# Patient Record
Sex: FEMALE | Race: WHITE | Employment: FULL TIME | URBAN - METROPOLITAN AREA
[De-identification: names, ages, dates, MRNs, and addresses within clinical notes are randomized per-mention and may not be internally consistent; named-entity substitution may affect disease eponyms.]

---

## 2023-03-23 PROBLEM — L40.9 PSORIASIS: Status: ACTIVE | Noted: 2023-03-23

## 2023-03-23 PROBLEM — R39.15 URINARY URGENCY: Status: ACTIVE | Noted: 2023-03-23

## 2023-03-23 PROBLEM — K76.6 PORTAL HYPERTENSION (MULTI): Status: ACTIVE | Noted: 2023-03-23

## 2023-03-23 PROBLEM — D50.9 IRON DEFICIENCY ANEMIA: Status: ACTIVE | Noted: 2023-03-23

## 2023-03-23 PROBLEM — D69.6 THROMBOCYTOPENIA (CMS-HCC): Status: ACTIVE | Noted: 2023-03-23

## 2023-03-23 PROBLEM — K50.90 CROHN'S DISEASE (MULTI): Status: ACTIVE | Noted: 2023-03-23

## 2023-03-23 PROBLEM — L93.0 LUPUS ERYTHEMATOSUS: Status: ACTIVE | Noted: 2023-03-23

## 2023-03-23 PROBLEM — I10 HYPERTENSION: Status: ACTIVE | Noted: 2023-03-23

## 2023-03-23 PROBLEM — H10.9 CONJUNCTIVITIS: Status: ACTIVE | Noted: 2023-03-23

## 2023-03-23 PROBLEM — K21.00 GASTROESOPHAGEAL REFLUX DISEASE WITH ESOPHAGITIS: Status: ACTIVE | Noted: 2023-03-23

## 2023-03-23 PROBLEM — E55.9 VITAMIN D DEFICIENCY: Status: ACTIVE | Noted: 2023-03-23

## 2023-03-23 PROBLEM — K74.60 HEPATIC CIRRHOSIS (MULTI): Status: ACTIVE | Noted: 2023-03-23

## 2023-03-23 PROBLEM — I35.8 AORTIC VALVE SCLEROSIS: Status: ACTIVE | Noted: 2023-03-23

## 2023-03-23 PROBLEM — R74.8 ALKALINE PHOSPHATASE ELEVATION: Status: ACTIVE | Noted: 2023-03-23

## 2023-03-23 PROBLEM — I85.00 ESOPHAGEAL VARICES (MULTI): Status: ACTIVE | Noted: 2023-03-23

## 2023-03-23 PROBLEM — R01.1 CARDIAC MURMUR: Status: ACTIVE | Noted: 2023-03-23

## 2023-03-23 PROBLEM — D61.818 PANCYTOPENIA (MULTI): Status: ACTIVE | Noted: 2023-03-23

## 2023-03-23 PROBLEM — G47.00 INSOMNIA: Status: ACTIVE | Noted: 2023-03-23

## 2023-03-23 PROBLEM — K76.0 NONALCOHOLIC FATTY LIVER DISEASE: Status: ACTIVE | Noted: 2023-03-23

## 2023-03-23 PROBLEM — M19.90 INFLAMMATORY ARTHRITIS: Status: ACTIVE | Noted: 2023-03-23

## 2023-03-23 RX ORDER — DESOXIMETASONE 0.5 MG/G
1 OINTMENT TOPICAL 2 TIMES DAILY
COMMUNITY
Start: 2018-03-19

## 2023-03-23 RX ORDER — PROPRANOLOL HYDROCHLORIDE 20 MG/1
1 TABLET ORAL 2 TIMES DAILY
COMMUNITY
Start: 2020-08-14 | End: 2023-06-12 | Stop reason: SDUPTHER

## 2023-03-23 RX ORDER — LISINOPRIL 20 MG/1
1 TABLET ORAL DAILY
COMMUNITY
Start: 2020-08-14 | End: 2023-08-31 | Stop reason: SDUPTHER

## 2023-03-23 RX ORDER — FAMOTIDINE 20 MG/1
1 TABLET, FILM COATED ORAL DAILY
COMMUNITY
End: 2023-08-31 | Stop reason: SDUPTHER

## 2023-03-23 RX ORDER — BETAMETHASONE DIPROPIONATE 0.5 MG/ML
1 LOTION, AUGMENTED TOPICAL DAILY
COMMUNITY
Start: 2012-06-21

## 2023-03-28 ENCOUNTER — OFFICE VISIT (OUTPATIENT)
Dept: PRIMARY CARE | Facility: CLINIC | Age: 63
End: 2023-03-28
Payer: COMMERCIAL

## 2023-03-28 VITALS
HEART RATE: 78 BPM | BODY MASS INDEX: 31.69 KG/M2 | DIASTOLIC BLOOD PRESSURE: 59 MMHG | OXYGEN SATURATION: 98 % | HEIGHT: 68 IN | SYSTOLIC BLOOD PRESSURE: 129 MMHG | WEIGHT: 209.1 LBS

## 2023-03-28 DIAGNOSIS — I10 PRIMARY HYPERTENSION: ICD-10-CM

## 2023-03-28 DIAGNOSIS — Z12.31 SCREENING MAMMOGRAM, ENCOUNTER FOR: Primary | ICD-10-CM

## 2023-03-28 DIAGNOSIS — K74.60 CIRRHOSIS OF LIVER WITHOUT ASCITES, UNSPECIFIED HEPATIC CIRRHOSIS TYPE (MULTI): ICD-10-CM

## 2023-03-28 DIAGNOSIS — M32.8 OTHER FORMS OF SYSTEMIC LUPUS ERYTHEMATOSUS, UNSPECIFIED ORGAN INVOLVEMENT STATUS (MULTI): ICD-10-CM

## 2023-03-28 DIAGNOSIS — K50.119 CROHN'S DISEASE OF LARGE INTESTINE WITH COMPLICATION (MULTI): ICD-10-CM

## 2023-03-28 DIAGNOSIS — I85.10 SECONDARY ESOPHAGEAL VARICES WITHOUT BLEEDING (MULTI): ICD-10-CM

## 2023-03-28 PROCEDURE — 3074F SYST BP LT 130 MM HG: CPT | Performed by: INTERNAL MEDICINE

## 2023-03-28 PROCEDURE — 99214 OFFICE O/P EST MOD 30 MIN: CPT | Performed by: INTERNAL MEDICINE

## 2023-03-28 PROCEDURE — 3078F DIAST BP <80 MM HG: CPT | Performed by: INTERNAL MEDICINE

## 2023-03-28 PROCEDURE — 1036F TOBACCO NON-USER: CPT | Performed by: INTERNAL MEDICINE

## 2023-03-28 RX ORDER — BETAMETHASONE DIPROPIONATE 0.5 MG/G
OINTMENT TOPICAL EVERY 12 HOURS
COMMUNITY

## 2023-03-28 RX ORDER — ACETAMINOPHEN 500 MG
TABLET ORAL
COMMUNITY

## 2023-03-28 ASSESSMENT — ENCOUNTER SYMPTOMS
SHORTNESS OF BREATH: 0
SORE THROAT: 0
LIGHT-HEADEDNESS: 0
MYALGIAS: 0
VOMITING: 0
HEADACHES: 0
WEAKNESS: 0
FREQUENCY: 0
DIARRHEA: 0
NAUSEA: 0
HEMATURIA: 0
FATIGUE: 0
DIFFICULTY URINATING: 0
CONSTIPATION: 0
ARTHRALGIAS: 0
FEVER: 0
DIZZINESS: 0
COUGH: 0
DYSURIA: 0
PALPITATIONS: 0
CHILLS: 0

## 2023-03-28 ASSESSMENT — PATIENT HEALTH QUESTIONNAIRE - PHQ9
2. FEELING DOWN, DEPRESSED OR HOPELESS: NOT AT ALL
1. LITTLE INTEREST OR PLEASURE IN DOING THINGS: NOT AT ALL
SUM OF ALL RESPONSES TO PHQ9 QUESTIONS 1 AND 2: 0

## 2023-03-28 ASSESSMENT — PAIN SCALES - GENERAL: PAINLEVEL: 0-NO PAIN

## 2023-03-28 NOTE — ASSESSMENT & PLAN NOTE
No active diarrhea or symptoms but patient is overdue to see her gastroenterologist Dr. Wilde and we encouraged her to make and keep that appointment

## 2023-03-28 NOTE — ASSESSMENT & PLAN NOTE
>>ASSESSMENT AND PLAN FOR LUPUS ERYTHEMATOSUS WRITTEN ON 3/28/2023  4:25 PM BY ISABELLA LING MD    Lupus is quiescent at this parent with no active disease.

## 2023-03-28 NOTE — ASSESSMENT & PLAN NOTE
Patient has hepatic cirrhosis secondary to her Crohn's disease and her lupus.  It did lead to esophageal varices with and portal hypertension but she has no known bleeding complications to date.

## 2023-03-28 NOTE — PROGRESS NOTES
"Subjective   Patient ID: Izabela Ware is a 62 y.o. female who presents for follow-up for HTN management.  BN    Patient is here for routine 4-month follow-up and follow-up on her lupus, Crohn's, hypertension, portal hypertension with reflux symptoms and esophageal varices with no bleeding.  Patient appears to be doing well with no active Crohn's lupus or other symptoms.  She has no current complaints.  She is overdue for follow-up for colonoscopy and long overdue for mammogram.        Review of Systems   Constitutional:  Negative for chills, fatigue and fever.   HENT:  Negative for sore throat.    Eyes:  Negative for visual disturbance.   Respiratory:  Negative for cough and shortness of breath.    Cardiovascular:  Negative for chest pain, palpitations and leg swelling.   Gastrointestinal:  Negative for constipation, diarrhea, nausea and vomiting.   Genitourinary:  Negative for difficulty urinating, dysuria, frequency, hematuria and urgency.   Musculoskeletal:  Negative for arthralgias and myalgias.   Skin:  Negative for rash.   Neurological:  Negative for dizziness, syncope, weakness, light-headedness and headaches.       Objective   Physical Exam  Constitutional:       Appearance: Normal appearance.   HENT:      Head: Normocephalic and atraumatic.      Nose: Nose normal.   Eyes:      Extraocular Movements: Extraocular movements intact.      Pupils: Pupils are equal, round, and reactive to light.   Cardiovascular:      Rate and Rhythm: Normal rate and regular rhythm.   Pulmonary:      Breath sounds: Normal breath sounds.   Abdominal:      General: Abdomen is flat. Bowel sounds are normal.      Palpations: Abdomen is soft.   Musculoskeletal:      Right lower leg: No edema.      Left lower leg: No edema.   Neurological:      Mental Status: She is alert.     /59 (BP Location: Left arm, Patient Position: Sitting)   Pulse 78   Ht 1.727 m (5' 8\")   Wt 94.8 kg (209 lb 1.6 oz)   SpO2 98%   BMI 31.79 kg/m² "       Assessment/Plan   Problem List Items Addressed This Visit          Circulatory    Esophageal varices (CMS/HCC)    Hypertension     Blood pressure is stable and well-controlled.            Digestive    Hepatic cirrhosis (CMS/HCC)     Patient has hepatic cirrhosis secondary to her Crohn's disease and her lupus.  It did lead to esophageal varices with and portal hypertension but she has no known bleeding complications to date.            Immune    Crohn's disease (CMS/HCC)     No active diarrhea or symptoms but patient is overdue to see her gastroenterologist Dr. Wilde and we encouraged her to make and keep that appointment            Other    Lupus erythematosus     Lupus is quiescent at this parent with no active disease.         Screening mammogram, encounter for - Primary    Relevant Orders    BI mammo bilateral screening tomosynthesis     Follow up Dr Miller in 4 months           It has been a pleasure seeing you.

## 2023-06-12 DIAGNOSIS — I10 PRIMARY HYPERTENSION: ICD-10-CM

## 2023-06-12 DIAGNOSIS — I85.10 SECONDARY ESOPHAGEAL VARICES WITHOUT BLEEDING (MULTI): ICD-10-CM

## 2023-06-12 RX ORDER — PROPRANOLOL HYDROCHLORIDE 20 MG/1
20 TABLET ORAL 2 TIMES DAILY
Qty: 180 TABLET | Refills: 3 | Status: SHIPPED | OUTPATIENT
Start: 2023-06-12 | End: 2023-08-31 | Stop reason: SDUPTHER

## 2023-06-12 NOTE — TELEPHONE ENCOUNTER
Propanolol refill requested via VM to [facility].  EDUIN pt.  Aleks  NOV 7-/  LOV 3-.  No med changes at LOV; Rx sent for approval.  aleks

## 2023-07-25 ENCOUNTER — OFFICE VISIT (OUTPATIENT)
Dept: PRIMARY CARE | Facility: CLINIC | Age: 63
End: 2023-07-25
Payer: COMMERCIAL

## 2023-07-25 VITALS
HEIGHT: 68 IN | DIASTOLIC BLOOD PRESSURE: 53 MMHG | SYSTOLIC BLOOD PRESSURE: 118 MMHG | OXYGEN SATURATION: 97 % | HEART RATE: 72 BPM | WEIGHT: 217.6 LBS | BODY MASS INDEX: 32.98 KG/M2

## 2023-07-25 DIAGNOSIS — E55.9 VITAMIN D DEFICIENCY: ICD-10-CM

## 2023-07-25 DIAGNOSIS — I10 PRIMARY HYPERTENSION: ICD-10-CM

## 2023-07-25 DIAGNOSIS — K21.00 GASTROESOPHAGEAL REFLUX DISEASE WITH ESOPHAGITIS WITHOUT HEMORRHAGE: ICD-10-CM

## 2023-07-25 DIAGNOSIS — K76.6 PORTAL HYPERTENSION (MULTI): ICD-10-CM

## 2023-07-25 DIAGNOSIS — Z00.00 ANNUAL PHYSICAL EXAM: Primary | ICD-10-CM

## 2023-07-25 DIAGNOSIS — M32.8 OTHER FORMS OF SYSTEMIC LUPUS ERYTHEMATOSUS, UNSPECIFIED ORGAN INVOLVEMENT STATUS (MULTI): ICD-10-CM

## 2023-07-25 DIAGNOSIS — L40.9 PSORIASIS: ICD-10-CM

## 2023-07-25 DIAGNOSIS — K50.10 CROHN'S DISEASE OF LARGE INTESTINE WITHOUT COMPLICATION (MULTI): ICD-10-CM

## 2023-07-25 PROCEDURE — 99214 OFFICE O/P EST MOD 30 MIN: CPT | Performed by: INTERNAL MEDICINE

## 2023-07-25 PROCEDURE — 1036F TOBACCO NON-USER: CPT | Performed by: INTERNAL MEDICINE

## 2023-07-25 PROCEDURE — 3074F SYST BP LT 130 MM HG: CPT | Performed by: INTERNAL MEDICINE

## 2023-07-25 PROCEDURE — 3078F DIAST BP <80 MM HG: CPT | Performed by: INTERNAL MEDICINE

## 2023-07-25 ASSESSMENT — ENCOUNTER SYMPTOMS
DIFFICULTY URINATING: 0
FREQUENCY: 0
COUGH: 0
CHILLS: 0
VOMITING: 0
DIZZINESS: 0
FEVER: 0
LIGHT-HEADEDNESS: 0
SHORTNESS OF BREATH: 0
MYALGIAS: 0
FATIGUE: 0
SORE THROAT: 0
CONSTIPATION: 0
DIARRHEA: 0
NAUSEA: 0
PALPITATIONS: 0
DYSURIA: 0
HEMATURIA: 0
ARTHRALGIAS: 0
WEAKNESS: 0
HEADACHES: 0

## 2023-07-25 ASSESSMENT — PATIENT HEALTH QUESTIONNAIRE - PHQ9
1. LITTLE INTEREST OR PLEASURE IN DOING THINGS: NOT AT ALL
2. FEELING DOWN, DEPRESSED OR HOPELESS: NOT AT ALL
SUM OF ALL RESPONSES TO PHQ9 QUESTIONS 1 AND 2: 0

## 2023-07-25 ASSESSMENT — PAIN SCALES - GENERAL: PAINLEVEL: 0-NO PAIN

## 2023-07-25 NOTE — PROGRESS NOTES
Subjective   Patient ID: Izabela Ware is a 63 y.o. female who presents for 4 month re for HTN management.  BN    Patient is here for 4-month follow-up for her hypertension lupus and Crohn's.  She remains stable and feels well overall.  At her last appointment we reminded her to follow-up with GI, Dr. Wilde as she is overdue for colonoscopy but she admits she did not do it she lost track of time.        Review of Systems   Constitutional:  Negative for chills, fatigue and fever.   HENT:  Negative for sore throat.    Eyes:  Negative for visual disturbance.   Respiratory:  Negative for cough and shortness of breath.    Cardiovascular:  Negative for chest pain, palpitations and leg swelling.   Gastrointestinal:  Negative for constipation, diarrhea, nausea and vomiting.   Genitourinary:  Negative for difficulty urinating, dysuria, frequency, hematuria and urgency.   Musculoskeletal:  Negative for arthralgias and myalgias.   Skin:  Negative for rash.   Neurological:  Negative for dizziness, syncope, weakness, light-headedness and headaches.       Objective   Medication Documentation Review Audit       Reviewed by Vannesa Miller MD (Physician) on 07/25/23 at 1052      Medication Order Taking? Sig Documenting Provider Last Dose Status   betamethasone dipropionate (Diprolene) 0.05 % ointment [id number] No every 12 hours. Historical MD Lluvia Not Taking Active   betamethasone, augmented, (Diprolene) 0.05 % lotion [id number] No Apply 1 Application topically once daily. Marilyn Teixeira MD Taking Active   cholecalciferol (Vitamin D-3) 5,000 Units tablet [id number] No Take by mouth. Historical Provider, MD Taking Active   desoximetasone 0.05 % ointment [id number] No Apply 1 Application topically in the morning and 1 Application before bedtime. Historical Provider, MD Taking Active   doxylamine-PE-DM-acetaminophen 6.25-5- mg/15 mL liquid [id number] No 1 tab Historical Provider, MD Not Taking Active   famotidine (Pepcid)  "20 mg tablet [id number] No Take 1 tablet (20 mg) by mouth once daily. Historical Provider, MD Not Taking Active   lisinopril 20 mg tablet [id number] No Take 1 tablet (20 mg) by mouth once daily. Historical Provider, MD Taking Active   propranolol (Inderal) 20 mg tablet [id number]  Take 1 tablet (20 mg) by mouth 2 times a day. Vannesa Miller MD  Active                  Physical Exam  Constitutional:       Appearance: Normal appearance.   HENT:      Head: Normocephalic and atraumatic.      Nose: Nose normal.   Eyes:      Extraocular Movements: Extraocular movements intact.      Pupils: Pupils are equal, round, and reactive to light.   Cardiovascular:      Rate and Rhythm: Normal rate and regular rhythm.   Pulmonary:      Breath sounds: Normal breath sounds.   Abdominal:      General: Abdomen is flat. Bowel sounds are normal.      Palpations: Abdomen is soft.   Musculoskeletal:      Right lower leg: No edema.      Left lower leg: No edema.   Neurological:      Mental Status: She is alert.     /53 (BP Location: Left arm, Patient Position: Sitting)   Pulse 72   Ht 1.715 m (5' 7.5\")   Wt 98.7 kg (217 lb 9.6 oz)   SpO2 97%   BMI 33.58 kg/m²       Assessment/Plan   Problem List Items Addressed This Visit       Crohn's disease (CMS/HCC)     Patient urged to follow-up with her gastroenterologist Dr. Wilde she is long overdue for her colonoscopy.  Patient says that she will try harder to get an appointment with him    I offered to have our staff help set up the appointment but she declined.         Relevant Orders    Lipid Panel    CBC    Comprehensive Metabolic Panel    TSH with reflex to Free T4 if abnormal    Vitamin D 1,25 Dihydroxy    Gastroesophageal reflux disease with esophagitis     Gastroesophageal reflux disease is stable with no breakthrough symptoms.         Relevant Orders    Lipid Panel    CBC    Comprehensive Metabolic Panel    TSH with reflex to Free T4 if abnormal    Vitamin D 1,25 Dihydroxy    " Hypertension     Hypertension is stable and well-controlled.         Relevant Orders    Lipid Panel    CBC    Comprehensive Metabolic Panel    TSH with reflex to Free T4 if abnormal    Vitamin D 1,25 Dihydroxy    Lupus erythematosus    Relevant Orders    Lipid Panel    CBC    Comprehensive Metabolic Panel    TSH with reflex to Free T4 if abnormal    Vitamin D 1,25 Dihydroxy    Portal hypertension (CMS/HCC)    Relevant Orders    Lipid Panel    CBC    Comprehensive Metabolic Panel    TSH with reflex to Free T4 if abnormal    Vitamin D 1,25 Dihydroxy    Vitamin D deficiency    Relevant Orders    Lipid Panel    CBC    Comprehensive Metabolic Panel    TSH with reflex to Free T4 if abnormal    Vitamin D 1,25 Dihydroxy    Psoriasis    Relevant Orders    Lipid Panel    CBC    Comprehensive Metabolic Panel    TSH with reflex to Free T4 if abnormal    Vitamin D 1,25 Dihydroxy     Other Visit Diagnoses       Annual physical exam    -  Primary    Relevant Orders    Lipid Panel    CBC    Comprehensive Metabolic Panel    TSH with reflex to Free T4 if abnormal    Vitamin D 1,25 Dihydroxy                   It has been a pleasure seeing you.

## 2023-07-25 NOTE — ASSESSMENT & PLAN NOTE
Patient urged to follow-up with her gastroenterologist Dr. Wilde she is long overdue for her colonoscopy.  Patient says that she will try harder to get an appointment with him    I offered to have our staff help set up the appointment but she declined.

## 2023-07-25 NOTE — PATIENT INSTRUCTIONS
Please follow up with Dr Wilde for your colonoscopy, you are very overdue    Get fasting labs in Oct before next appointment    Follow up Dr Miller in 4 months  30 min appointment for HTN etc          Ways to Help Prevent Falls at Home    Quick Tips   ? Ask for help if you need it. Most people want to help!   ? Get up slowly after sitting or laying down   ? Wear a medical alert device or keep cell phone in your pocket   ? Use night lights, especially areas near a bathroom   ? Keep the items you use often within reach on a small stool or end table   ? Use an assistive device such as walker or cane, as directed by provider/physical therapy   ? Use a non-slip mat and grab bars in your bathroom. Look for home health sections for best options     Other Areas to Focus On   ? Exercise and nutrition: Regular exercise or taking a falls prevention class are great ways improve strength and balance. Don’t forget to stay hydrated and bring a snack!   ? Medicine side effects: Some medicines can make you sleepy or dizzy, which could cause a fall. Ask your healthcare provider about the side effects your medicines could cause. Be sure to let them know if you take any vitamins or supplements as well.   ? Tripping hazards: Remove items you could trip on, such as loose mats, rugs, cords, and clutter. Wear closed toe shoes with rubber soles.   ? Health and wellness: Get regular checkups with your healthcare provider, plus routine vision and hearing screenings. Talk with your healthcare provider about:   o Your medicines and the possible side effects - bring them in a bag if that is easier!   o Problems with balance or feeling dizzy   o Ways to promote bone health, such as Vitamin D and calcium supplements   o Questions or concerns about falling     *Ask your healthcare team if you have questions     [facility], 2022

## 2023-08-31 DIAGNOSIS — I10 PRIMARY HYPERTENSION: ICD-10-CM

## 2023-08-31 DIAGNOSIS — I85.10 SECONDARY ESOPHAGEAL VARICES WITHOUT BLEEDING (MULTI): ICD-10-CM

## 2023-08-31 RX ORDER — PROPRANOLOL HYDROCHLORIDE 20 MG/1
20 TABLET ORAL 2 TIMES DAILY
Qty: 180 TABLET | Refills: 0 | Status: SHIPPED | OUTPATIENT
Start: 2023-08-31 | End: 2024-01-30 | Stop reason: SDUPTHER

## 2023-08-31 RX ORDER — LISINOPRIL 20 MG/1
20 TABLET ORAL DAILY
Qty: 30 TABLET | Refills: 0 | Status: SHIPPED | OUTPATIENT
Start: 2023-08-31 | End: 2023-09-25 | Stop reason: SDUPTHER

## 2023-08-31 RX ORDER — FAMOTIDINE 20 MG/1
20 TABLET, FILM COATED ORAL DAILY
Qty: 30 TABLET | Refills: 0 | Status: SHIPPED | OUTPATIENT
Start: 2023-08-31 | End: 2024-01-30 | Stop reason: SDUPTHER

## 2023-08-31 NOTE — TELEPHONE ENCOUNTER
Med refill    lisinopril 20 mg tablet   Take 1 tablet (20 mg) by mouth once daily     propranolol (Inderal) 20 mg tablet    Take 1 tablet (20 mg) by mouth 2 times a day    famotidine (Pepcid) 20 mg tablet    Take 1 tablet (20 mg) by mouth once daily     Lis Wilde    BN

## 2023-09-25 DIAGNOSIS — I10 PRIMARY HYPERTENSION: ICD-10-CM

## 2023-09-25 NOTE — TELEPHONE ENCOUNTER
Med refill from voicemail      lisinopril 20 mg tablet    Take 1 tablet (20 mg) by mouth once daily.,     Lis Wilde    BN

## 2023-09-26 RX ORDER — LISINOPRIL 20 MG/1
20 TABLET ORAL DAILY
Qty: 30 TABLET | Refills: 0 | Status: SHIPPED | OUTPATIENT
Start: 2023-09-26 | End: 2023-10-31 | Stop reason: SDUPTHER

## 2023-10-31 DIAGNOSIS — I10 PRIMARY HYPERTENSION: ICD-10-CM

## 2023-10-31 RX ORDER — LISINOPRIL 20 MG/1
20 TABLET ORAL DAILY
Qty: 30 TABLET | Refills: 0 | Status: SHIPPED | OUTPATIENT
Start: 2023-10-31 | End: 2023-12-12 | Stop reason: SDUPTHER

## 2023-11-21 ENCOUNTER — APPOINTMENT (OUTPATIENT)
Dept: PRIMARY CARE | Facility: CLINIC | Age: 63
End: 2023-11-21
Payer: COMMERCIAL

## 2023-12-12 DIAGNOSIS — I10 PRIMARY HYPERTENSION: ICD-10-CM

## 2023-12-12 RX ORDER — LISINOPRIL 20 MG/1
20 TABLET ORAL DAILY
Qty: 30 TABLET | Refills: 0 | Status: SHIPPED | OUTPATIENT
Start: 2023-12-12 | End: 2024-01-30 | Stop reason: SDUPTHER

## 2023-12-26 ENCOUNTER — LAB (OUTPATIENT)
Dept: LAB | Facility: LAB | Age: 63
End: 2023-12-26
Payer: COMMERCIAL

## 2023-12-26 DIAGNOSIS — L40.9 PSORIASIS: ICD-10-CM

## 2023-12-26 DIAGNOSIS — M32.8 OTHER FORMS OF SYSTEMIC LUPUS ERYTHEMATOSUS, UNSPECIFIED ORGAN INVOLVEMENT STATUS (MULTI): ICD-10-CM

## 2023-12-26 DIAGNOSIS — K76.6 PORTAL HYPERTENSION (MULTI): ICD-10-CM

## 2023-12-26 DIAGNOSIS — K21.00 GASTROESOPHAGEAL REFLUX DISEASE WITH ESOPHAGITIS WITHOUT HEMORRHAGE: ICD-10-CM

## 2023-12-26 DIAGNOSIS — Z00.00 ANNUAL PHYSICAL EXAM: ICD-10-CM

## 2023-12-26 DIAGNOSIS — K50.10 CROHN'S DISEASE OF LARGE INTESTINE WITHOUT COMPLICATION (MULTI): ICD-10-CM

## 2023-12-26 DIAGNOSIS — E55.9 VITAMIN D DEFICIENCY: ICD-10-CM

## 2023-12-26 DIAGNOSIS — I10 PRIMARY HYPERTENSION: ICD-10-CM

## 2023-12-26 LAB
ALBUMIN SERPL BCP-MCNC: 3.4 G/DL (ref 3.4–5)
ALP SERPL-CCNC: 81 U/L (ref 33–136)
ALT SERPL W P-5'-P-CCNC: 14 U/L (ref 7–45)
ANION GAP SERPL CALC-SCNC: 12 MMOL/L (ref 10–20)
AST SERPL W P-5'-P-CCNC: 27 U/L (ref 9–39)
BILIRUB SERPL-MCNC: 1.7 MG/DL (ref 0–1.2)
BUN SERPL-MCNC: 9 MG/DL (ref 6–23)
CALCIUM SERPL-MCNC: 9.2 MG/DL (ref 8.6–10.6)
CHLORIDE SERPL-SCNC: 106 MMOL/L (ref 98–107)
CHOLEST SERPL-MCNC: 157 MG/DL (ref 0–199)
CHOLESTEROL/HDL RATIO: 3
CO2 SERPL-SCNC: 27 MMOL/L (ref 21–32)
CREAT SERPL-MCNC: 0.62 MG/DL (ref 0.5–1.05)
ERYTHROCYTE [DISTWIDTH] IN BLOOD BY AUTOMATED COUNT: 13.4 % (ref 11.5–14.5)
GFR SERPL CREATININE-BSD FRML MDRD: >90 ML/MIN/1.73M*2
GLUCOSE SERPL-MCNC: 126 MG/DL (ref 74–99)
HCT VFR BLD AUTO: 39.5 % (ref 36–46)
HDLC SERPL-MCNC: 53.2 MG/DL
HGB BLD-MCNC: 13 G/DL (ref 12–16)
LDLC SERPL CALC-MCNC: 90 MG/DL
MCH RBC QN AUTO: 30.9 PG (ref 26–34)
MCHC RBC AUTO-ENTMCNC: 32.9 G/DL (ref 32–36)
MCV RBC AUTO: 94 FL (ref 80–100)
NON HDL CHOLESTEROL: 104 MG/DL (ref 0–149)
NRBC BLD-RTO: 0 /100 WBCS (ref 0–0)
PLATELET # BLD AUTO: 103 X10*3/UL (ref 150–450)
POTASSIUM SERPL-SCNC: 4.5 MMOL/L (ref 3.5–5.3)
PROT SERPL-MCNC: 6.7 G/DL (ref 6.4–8.2)
RBC # BLD AUTO: 4.21 X10*6/UL (ref 4–5.2)
SODIUM SERPL-SCNC: 140 MMOL/L (ref 136–145)
TRIGL SERPL-MCNC: 67 MG/DL (ref 0–149)
TSH SERPL-ACNC: 2.54 MIU/L (ref 0.44–3.98)
VLDL: 13 MG/DL (ref 0–40)
WBC # BLD AUTO: 3.8 X10*3/UL (ref 4.4–11.3)

## 2023-12-26 PROCEDURE — 85027 COMPLETE CBC AUTOMATED: CPT

## 2023-12-26 PROCEDURE — 36415 COLL VENOUS BLD VENIPUNCTURE: CPT

## 2023-12-26 PROCEDURE — 80061 LIPID PANEL: CPT

## 2023-12-26 PROCEDURE — 82652 VIT D 1 25-DIHYDROXY: CPT

## 2023-12-26 PROCEDURE — 84443 ASSAY THYROID STIM HORMONE: CPT

## 2023-12-26 PROCEDURE — 80053 COMPREHEN METABOLIC PANEL: CPT

## 2023-12-27 ENCOUNTER — TELEPHONE (OUTPATIENT)
Dept: PRIMARY CARE | Facility: CLINIC | Age: 63
End: 2023-12-27
Payer: COMMERCIAL

## 2023-12-27 NOTE — TELEPHONE ENCOUNTER
Left message on patient's voicemail to call the office to retrieve message from the Doctor.  LITA

## 2023-12-27 NOTE — TELEPHONE ENCOUNTER
----- Message from Vannesa Miller MD sent at 12/27/2023  8:00 AM EST -----  Please notify patient her sugar was elevated on her chemistry profile so please watch carbohydrates and sugars to avoid diabetes.  Her platelets are low on her complete blood count which is chronic and unchanged for her.  All other blood work was normal.

## 2023-12-28 LAB — 1,25(OH)2D3 SERPL-MCNC: 27.2 PG/ML (ref 19.9–79.3)

## 2024-01-03 ENCOUNTER — TELEPHONE (OUTPATIENT)
Dept: PRIMARY CARE | Facility: CLINIC | Age: 64
End: 2024-01-03
Payer: COMMERCIAL

## 2024-01-30 DIAGNOSIS — I10 PRIMARY HYPERTENSION: ICD-10-CM

## 2024-01-30 DIAGNOSIS — I85.10 SECONDARY ESOPHAGEAL VARICES WITHOUT BLEEDING (MULTI): ICD-10-CM

## 2024-01-30 RX ORDER — LISINOPRIL 20 MG/1
20 TABLET ORAL DAILY
Qty: 30 TABLET | Refills: 0 | Status: SHIPPED | OUTPATIENT
Start: 2024-01-30 | End: 2024-03-05 | Stop reason: SDUPTHER

## 2024-01-30 RX ORDER — FAMOTIDINE 20 MG/1
20 TABLET, FILM COATED ORAL DAILY
Qty: 30 TABLET | Refills: 0 | Status: SHIPPED | OUTPATIENT
Start: 2024-01-30 | End: 2024-03-05 | Stop reason: SDUPTHER

## 2024-01-30 RX ORDER — PROPRANOLOL HYDROCHLORIDE 20 MG/1
20 TABLET ORAL 2 TIMES DAILY
Qty: 180 TABLET | Refills: 0 | Status: SHIPPED | OUTPATIENT
Start: 2024-01-30

## 2024-01-30 NOTE — TELEPHONE ENCOUNTER
Med refill    propranolol (Inderal) 20 mg tablet    Take 1 tablet (20 mg) by mouth 2 times a day.     famotidine (Pepcid) 20 mg tablet   Take 1 tablet (20 mg) by mouth once daily.     lisinopril 20 mg tablet   Take 1 tablet (20 mg) by mouth once daily.     Lis Rey    BN

## 2024-01-30 NOTE — TELEPHONE ENCOUNTER
Patient called for refills on     Lisinoprol 20 mg tablet take 1 tablet by mouth once daily    30  Propranolol 20 mg take one tablet by mouth 2 times a day   180  Famotidine 20 mg take one tablet by mouth once daily          30    She is out  She has an appt on 3/5/2024    Lis Rey  [phone]

## 2024-03-05 ENCOUNTER — OFFICE VISIT (OUTPATIENT)
Dept: PRIMARY CARE | Facility: CLINIC | Age: 64
End: 2024-03-05
Payer: COMMERCIAL

## 2024-03-05 VITALS
BODY MASS INDEX: 34.19 KG/M2 | HEART RATE: 69 BPM | WEIGHT: 225.6 LBS | DIASTOLIC BLOOD PRESSURE: 58 MMHG | OXYGEN SATURATION: 96 % | HEIGHT: 68 IN | SYSTOLIC BLOOD PRESSURE: 125 MMHG

## 2024-03-05 DIAGNOSIS — I85.10 SECONDARY ESOPHAGEAL VARICES WITHOUT BLEEDING (MULTI): ICD-10-CM

## 2024-03-05 DIAGNOSIS — I10 PRIMARY HYPERTENSION: ICD-10-CM

## 2024-03-05 DIAGNOSIS — Z23 FLU VACCINE NEED: ICD-10-CM

## 2024-03-05 DIAGNOSIS — K50.10 CROHN'S DISEASE OF LARGE INTESTINE WITHOUT COMPLICATION (MULTI): ICD-10-CM

## 2024-03-05 DIAGNOSIS — Z12.31 SCREENING MAMMOGRAM FOR BREAST CANCER: Primary | ICD-10-CM

## 2024-03-05 DIAGNOSIS — I35.8 AORTIC VALVE SCLEROSIS: ICD-10-CM

## 2024-03-05 DIAGNOSIS — M32.8 OTHER FORMS OF SYSTEMIC LUPUS ERYTHEMATOSUS, UNSPECIFIED ORGAN INVOLVEMENT STATUS (MULTI): ICD-10-CM

## 2024-03-05 DIAGNOSIS — K21.00 GASTROESOPHAGEAL REFLUX DISEASE WITH ESOPHAGITIS WITHOUT HEMORRHAGE: ICD-10-CM

## 2024-03-05 PROCEDURE — 3078F DIAST BP <80 MM HG: CPT | Performed by: INTERNAL MEDICINE

## 2024-03-05 PROCEDURE — 99214 OFFICE O/P EST MOD 30 MIN: CPT | Performed by: INTERNAL MEDICINE

## 2024-03-05 PROCEDURE — 3074F SYST BP LT 130 MM HG: CPT | Performed by: INTERNAL MEDICINE

## 2024-03-05 PROCEDURE — 90686 IIV4 VACC NO PRSV 0.5 ML IM: CPT | Performed by: INTERNAL MEDICINE

## 2024-03-05 PROCEDURE — 90471 IMMUNIZATION ADMIN: CPT | Performed by: INTERNAL MEDICINE

## 2024-03-05 PROCEDURE — 1036F TOBACCO NON-USER: CPT | Performed by: INTERNAL MEDICINE

## 2024-03-05 RX ORDER — FAMOTIDINE 20 MG/1
20 TABLET, FILM COATED ORAL DAILY
Qty: 30 TABLET | Refills: 11 | Status: SHIPPED | OUTPATIENT
Start: 2024-03-05

## 2024-03-05 RX ORDER — LISINOPRIL 20 MG/1
20 TABLET ORAL DAILY
Qty: 30 TABLET | Refills: 11 | Status: SHIPPED | OUTPATIENT
Start: 2024-03-05

## 2024-03-05 ASSESSMENT — ENCOUNTER SYMPTOMS
ARTHRALGIAS: 0
COUGH: 0
HEMATURIA: 0
SORE THROAT: 0
DIARRHEA: 0
HEADACHES: 0
DIFFICULTY URINATING: 0
LIGHT-HEADEDNESS: 0
FEVER: 0
FREQUENCY: 0
PALPITATIONS: 0
CONSTIPATION: 0
WEAKNESS: 0
CHILLS: 0
VOMITING: 0
DYSURIA: 0
FATIGUE: 0
SHORTNESS OF BREATH: 0
DIZZINESS: 0
MYALGIAS: 0
NAUSEA: 0

## 2024-03-05 ASSESSMENT — PATIENT HEALTH QUESTIONNAIRE - PHQ9
2. FEELING DOWN, DEPRESSED OR HOPELESS: NOT AT ALL
SUM OF ALL RESPONSES TO PHQ9 QUESTIONS 1 AND 2: 0
1. LITTLE INTEREST OR PLEASURE IN DOING THINGS: NOT AT ALL

## 2024-03-05 ASSESSMENT — PAIN SCALES - GENERAL: PAINLEVEL: 0-NO PAIN

## 2024-03-05 NOTE — ASSESSMENT & PLAN NOTE
Crohn's disease is not active at this time.  She denies any symptoms such as diarrhea abdominal pain cramping or blood

## 2024-03-05 NOTE — ASSESSMENT & PLAN NOTE
Lupus is not active at this time as she has no symptoms.  She says from time to time she will start getting body aches and that is when she knows it is flaring up but has not bothered her in the last few years.

## 2024-03-05 NOTE — ASSESSMENT & PLAN NOTE
Last echo was August 2022 which showed very mild aortic sclerosis.  Patient does have a murmur but is asymptomatic so we will probably get another echo this fall

## 2024-03-05 NOTE — PROGRESS NOTES
Subjective   Patient ID: Izabela Ware is a 63 y.o. female who presents for follow up for HTN management.  BN    Patient is here for routine follow-up for her hypertension, Crohn's disease and medication management.  She notes that from time to time she gets some muscle aches from the lupus but overall believes that it is not active.    Patient does need refills on her lisinopril and famotidine    Patient will receive a flu shot today        Review of Systems   Constitutional:  Negative for chills, fatigue and fever.   HENT:  Negative for sore throat.    Eyes:  Negative for visual disturbance.   Respiratory:  Negative for cough and shortness of breath.    Cardiovascular:  Negative for chest pain, palpitations and leg swelling.   Gastrointestinal:  Negative for constipation, diarrhea, nausea and vomiting.   Genitourinary:  Negative for difficulty urinating, dysuria, frequency, hematuria and urgency.   Musculoskeletal:  Negative for arthralgias and myalgias.   Skin:  Negative for rash.   Neurological:  Negative for dizziness, syncope, weakness, light-headedness and headaches.       Objective   Medication Documentation Review Audit       Reviewed by Vannesa Miller MD (Physician) on 03/05/24 at 1018      Medication Order Taking? Sig Documenting Provider Last Dose Status   betamethasone dipropionate (Diprolene) 0.05 % ointment [id number] No every 12 hours. Marilyn Teixeira MD Not Taking Active   betamethasone, augmented, (Diprolene) 0.05 % lotion [id number] No Apply 1 Application topically once daily. Marilyn Teixeira MD Taking Active   cholecalciferol (Vitamin D-3) 5,000 Units tablet [id number] No Take by mouth. Marilyn Teixeira MD Taking Active   desoximetasone 0.05 % ointment [id number] No Apply 1 Application topically in the morning and 1 Application before bedtime. Marilyn Teixeira MD Taking Active   doxylamine-PE-DM-acetaminophen 6.25-5- mg/15 mL liquid [id number] No 1 tab Marilyn Teixeira MD Not  "Taking Active   famotidine (Pepcid) 20 mg tablet [id number]  Take 1 tablet (20 mg) by mouth once daily. Vannesa Miller MD  Active   lisinopril 20 mg tablet [id number]  Take 1 tablet (20 mg) by mouth once daily. Vannesa Miller MD  Active   propranolol (Inderal) 20 mg tablet [id number]  Take 1 tablet (20 mg) by mouth 2 times a day. Vannesa Miller MD  Active                  Allergies   Allergen Reactions    Hydrocodone-Acetaminophen Unknown    Hydroxychloroquine Other    Sulfa (Sulfonamide Antibiotics) Unknown    Amoxicillin Rash     Physical Exam  Constitutional:       Appearance: Normal appearance.   HENT:      Head: Normocephalic and atraumatic.      Nose: Nose normal.   Eyes:      Extraocular Movements: Extraocular movements intact.      Pupils: Pupils are equal, round, and reactive to light.   Cardiovascular:      Rate and Rhythm: Normal rate and regular rhythm.      Heart sounds: Murmur heard.   Pulmonary:      Breath sounds: Normal breath sounds.   Abdominal:      General: Abdomen is flat. Bowel sounds are normal.      Palpations: Abdomen is soft.   Musculoskeletal:      Right lower leg: No edema.      Left lower leg: No edema.   Neurological:      Mental Status: She is alert.     /58 (BP Location: Left arm, Patient Position: Sitting)   Pulse 69   Ht 1.715 m (5' 7.5\")   Wt 102 kg (225 lb 9.6 oz)   SpO2 96%   BMI 34.81 kg/m²       Assessment/Plan   Problem List Items Addressed This Visit       Aortic valve sclerosis     Last echo was August 2022 which showed very mild aortic sclerosis.  Patient does have a murmur but is asymptomatic so we will probably get another echo this fall         Crohn's disease (CMS/HCC)     Crohn's disease is not active at this time.  She denies any symptoms such as diarrhea abdominal pain cramping or blood         Esophageal varices (CMS/HCC)    Relevant Medications    famotidine (Pepcid) 20 mg tablet    Gastroesophageal reflux disease with esophagitis     Patient states " asymptomatic at this time with only famotidine once daily.  She denies heartburn reflux or any other symptoms.         Hypertension     Hypertension or blood pressure is stable and well-controlled and she was given a refill on her lisinopril which will stay at his current dose of 20 mg daily.         Relevant Medications    lisinopril 20 mg tablet    Other forms of systemic lupus erythematosus, unspecified organ involvement status (CMS/HCC)     Lupus is not active at this time as she has no symptoms.  She says from time to time she will start getting body aches and that is when she knows it is flaring up but has not bothered her in the last few years.          Other Visit Diagnoses       Screening mammogram for breast cancer    -  Primary    Relevant Orders    BI mammo bilateral screening tomosynthesis    Flu vaccine need        Relevant Orders    Flu vaccine (IIV4) age 6 months and greater, preservative free (Completed)          Patient is overdue for mammogram and agrees to get 1 at this time.  She admits there is no reason to not get it except she just kept putting it off  Patient did receive a flu shot today  Regular follow-up with me will be in 4 months or as needed  Annual labs were completed in December  Last colonoscopy was 2016 but she has been asymptomatic and therefore not had another 1         It has been a pleasure seeing you.  Vannesa Miller MD

## 2024-03-05 NOTE — ASSESSMENT & PLAN NOTE
Hypertension or blood pressure is stable and well-controlled and she was given a refill on her lisinopril which will stay at his current dose of 20 mg daily.

## 2024-03-05 NOTE — PATIENT INSTRUCTIONS
Get mammogram    Follow up Dr Miller in 4 months for HTN etc       OBSTETRICS H&P    Patient: Terry Guerra Date: 2023   : 2004 Attending: Ned Whitmore MD   19 year old female Admit Date: 2023      Chief Complaint     Chief Complaint   Patient presents with   • Contractions     History of Present Illness   Terry Guerra is a 19 year old  female at 40w1d  gestation who presents with painful contractions that began 1100 this am. Denies VB, LOF. Endorses good FM.Denies HA/vision changes/RUQ pain/edema/N/E/F/C/dysuria.     Pregnancy Course  1.) Maternal XXX Syndrome   2.) Abnormal Fetal cffDNA  - Abnormal sex chromosome (X)  - Seen and evaluated by genetics  Genetics: Abnormal sex chromosome (X)    Vaccines: Tdap UTD     Last Ultrasound  23 US AC 27%, EFW 55% (413g), cephalic, posterior placenta    Prenatal Labs  Blood type/Antibody: B positive, Gisella negative  Rubella: Immune  HepBSAg: Neg  RPR: Neg  GBS: Positive   HIV: Neg  Varicella: Immune  GC/CT: Neg  1hr gtt: 147 elevated  3hr gtt: 82/200/154/126 wnl  2nd Trimester H&H: 11.9/37.9%  3rd Trimester HIV/RPR: NRx2    Lab Results   Component Value Date    RUBEL 167.6 2023    HIV Nonreactive 2023    HIV Nonreactive 2023    VARIC Immune 2023    GCPT Negative 2023    CHPT Negative 2023    DSPG 147 (H) 05/15/2023    HGB 13.8 2023    HGB 11.9 (L) 05/15/2023    RPR Nonreactive 2023      OB History  # 1 - Date: None, Sex: None, Weight: None, GA: None, Delivery: None, Apgar1: None, Apgar5: None, Living: None, Birth Comments: None    Gyn Hx: Denies h/o STIs or abnormal pap smears  PMH: noncontributory   PSH: noncontributory   Soc: Denies tobacco, alcohol and drug use  Allergies: NKDA  Meds: prenatal vitamins     Physical Exam     Vital Last Value 24 Hour Range   Temperature 98.2 °F (36.8 °C) Temp  Min: 98.2 °F (36.8 °C)  Max: 98.2 °F (36.8 °C)   Pulse 64 Pulse  Min: 64  Max: 67   Respiratory 16 Resp  Min: 16  Max: 16   Blood Pressure 134/70 BP  Min: 134/70   Max: 134/70   Pulse Oximetry 99 % SpO2  Min: 98 %  Max: 99 %   CVP   No data recorded     Visit Vitals  /70   Pulse 64   Temp 98.2 °F (36.8 °C) (Oral)   Resp 16   Ht 5' 4\" (1.626 m)   Wt 69.6 kg   LMP 2022   SpO2 99%   BMI 26.34 kg/m²      General: well developed, well nourished. No acute distress.  Resp: Respirations are non-labored, no accessory muscle usage. No respiratory distress.  CV: Normal peripheral perfusion.  Abdomen: Soft, gravid, non-tender.  : Normal external genitalia  Neurological: A&O x3.  Skin: Warm, dry, normal for ethnicity.   Psychiatric: Cooperative.     Limited BSUS: cephalic   EFW by Leopold's: 3500g  Membranes: intact  FHT: baseline 135/ moderate variability/+accels/-decels   Prosper:  q4-6 min  SVE: 3/100/-2     Assessment and Plan     Terry Guerra is a 19 year old  female at 40w1d gestation who presented with painful contractions .    - Admit to L&D  - Draw CBC and T&S  - cEFM and Prosper  - FWB: category 1   - GBS: Positive    -Ampicillin per protocol   - Labor: Latent   - Can augment labor with pitocin   - Will consider AROM after prophylactic ampicillin administered  - Pain: COLE prn     Attending: Dr. Natalya Rashid, MS4   Claire Mcfarland MD PGY1  Obstetrics & Gynecology  23 4:23 AM

## 2024-03-05 NOTE — ASSESSMENT & PLAN NOTE
Patient states asymptomatic at this time with only famotidine once daily.  She denies heartburn reflux or any other symptoms.

## 2024-03-15 ENCOUNTER — HOSPITAL ENCOUNTER (OUTPATIENT)
Dept: RADIOLOGY | Facility: CLINIC | Age: 64
Discharge: HOME | End: 2024-03-15
Payer: COMMERCIAL

## 2024-03-15 VITALS — HEIGHT: 67 IN | BODY MASS INDEX: 35.31 KG/M2 | WEIGHT: 225 LBS

## 2024-03-15 DIAGNOSIS — Z12.31 SCREENING MAMMOGRAM FOR BREAST CANCER: ICD-10-CM

## 2024-03-15 PROCEDURE — 77067 SCR MAMMO BI INCL CAD: CPT

## 2024-03-15 PROCEDURE — 77067 SCR MAMMO BI INCL CAD: CPT | Performed by: RADIOLOGY

## 2024-03-15 PROCEDURE — 77063 BREAST TOMOSYNTHESIS BI: CPT | Performed by: RADIOLOGY

## 2024-03-20 ENCOUNTER — TELEPHONE (OUTPATIENT)
Dept: PRIMARY CARE | Facility: CLINIC | Age: 64
End: 2024-03-20
Payer: COMMERCIAL

## 2024-03-20 NOTE — TELEPHONE ENCOUNTER
----- Message from Vannesa Miller MD sent at 3/20/2024  4:26 PM EDT -----  Please notify patient that her mammogram showed no evidence of cancer.  Her next mammogram will be due in 1 year.

## 2024-06-05 ENCOUNTER — LAB REQUISITION (OUTPATIENT)
Dept: LAB | Facility: HOSPITAL | Age: 64
End: 2024-06-05

## 2024-06-05 DIAGNOSIS — N39.0 URINARY TRACT INFECTION, SITE NOT SPECIFIED: ICD-10-CM

## 2024-06-05 PROCEDURE — 87086 URINE CULTURE/COLONY COUNT: CPT

## 2024-06-07 LAB — BACTERIA UR CULT: ABNORMAL

## 2024-07-16 ENCOUNTER — APPOINTMENT (OUTPATIENT)
Dept: PRIMARY CARE | Facility: CLINIC | Age: 64
End: 2024-07-16

## 2024-07-16 ENCOUNTER — HOSPITAL ENCOUNTER (OUTPATIENT)
Dept: RADIOLOGY | Facility: CLINIC | Age: 64
Discharge: HOME | End: 2024-07-16
Payer: COMMERCIAL

## 2024-07-16 VITALS
BODY MASS INDEX: 35.97 KG/M2 | DIASTOLIC BLOOD PRESSURE: 72 MMHG | OXYGEN SATURATION: 95 % | SYSTOLIC BLOOD PRESSURE: 123 MMHG | HEIGHT: 67 IN | HEART RATE: 80 BPM | WEIGHT: 229.2 LBS

## 2024-07-16 DIAGNOSIS — R01.1 CARDIAC MURMUR: ICD-10-CM

## 2024-07-16 DIAGNOSIS — K74.4 SECONDARY BILIARY CIRRHOSIS (MULTI): ICD-10-CM
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
## 2024-07-16 DIAGNOSIS — S50.12XA CONTUSION OF LEFT FOREARM, INITIAL ENCOUNTER: ICD-10-CM

## 2024-07-16 DIAGNOSIS — K21.9 GASTROESOPHAGEAL REFLUX DISEASE WITHOUT ESOPHAGITIS: ICD-10-CM

## 2024-07-16 DIAGNOSIS — I85.10 SECONDARY ESOPHAGEAL VARICES WITHOUT BLEEDING (MULTI): ICD-10-CM

## 2024-07-16 DIAGNOSIS — K50.819 CROHN'S DISEASE OF SMALL AND LARGE INTESTINES WITH COMPLICATION (MULTI): ICD-10-CM

## 2024-07-16 DIAGNOSIS — K76.6 PORTAL HYPERTENSION (MULTI): ICD-10-CM

## 2024-07-16 DIAGNOSIS — S50.12XA CONTUSION OF LEFT FOREARM, INITIAL ENCOUNTER: Primary | ICD-10-CM

## 2024-07-16 DIAGNOSIS — I10 PRIMARY HYPERTENSION: ICD-10-CM

## 2024-07-16 PROBLEM — K21.00 GASTROESOPHAGEAL REFLUX DISEASE WITH ESOPHAGITIS: Status: RESOLVED | Noted: 2023-03-23 | Resolved: 2024-07-16

## 2024-07-16 PROCEDURE — 1036F TOBACCO NON-USER: CPT | Performed by: INTERNAL MEDICINE

## 2024-07-16 PROCEDURE — 99214 OFFICE O/P EST MOD 30 MIN: CPT | Performed by: INTERNAL MEDICINE

## 2024-07-16 PROCEDURE — 73090 X-RAY EXAM OF FOREARM: CPT | Mod: LEFT SIDE | Performed by: RADIOLOGY

## 2024-07-16 PROCEDURE — 3074F SYST BP LT 130 MM HG: CPT | Performed by: INTERNAL MEDICINE

## 2024-07-16 PROCEDURE — 3078F DIAST BP <80 MM HG: CPT | Performed by: INTERNAL MEDICINE

## 2024-07-16 PROCEDURE — 73090 X-RAY EXAM OF FOREARM: CPT | Mod: LT

## 2024-07-16 ASSESSMENT — ENCOUNTER SYMPTOMS
VOMITING: 0
NAUSEA: 0
CONSTIPATION: 0
COUGH: 0
SORE THROAT: 0
FREQUENCY: 0
PALPITATIONS: 0
DIZZINESS: 0
FATIGUE: 0
ARTHRALGIAS: 0
DYSURIA: 0
FEVER: 0
LIGHT-HEADEDNESS: 0
ABDOMINAL PAIN: 0
SHORTNESS OF BREATH: 0
DIARRHEA: 0
TROUBLE SWALLOWING: 0

## 2024-07-16 ASSESSMENT — PAIN SCALES - GENERAL: PAINLEVEL: 0-NO PAIN

## 2024-07-16 NOTE — PROGRESS NOTES
Subjective   Patient ID: Izabela Ware is a 64 y.o. female who presents for 4 month follow up for HTN management.    Patient is here for routine follow-up for her hypertension Crohn's disease reflux symptoms and cirrhosis.  Her  passed away this past month so she has a lot of decisions to make about her life in general.  She is still working for the [employer] but is considering retiring as she actually might make more intermediate money then while she is continuing to work.  She also needs to find out whether or not her health insurance is covered between now and when she turns 65 in the spring 2025    Patient was in the barn and fell on July 11 hurting her left forearm.  She had a nurse friend look at it and although it is bruised and swollen she is able to move it and assumed that she was fine.  On the ulnar side of the forearm she has a large hematoma encircling the entire wrist and forearm with a large area that is swollen on the ulnar side which is approximately 4 x 8 cm in size.  I am concerned she may have a small avulsion fracture or something related and have recommended an x-ray which the patient agrees to        Review of Systems   Constitutional:  Negative for fatigue and fever.   HENT:  Negative for sore throat and trouble swallowing.    Eyes:  Negative for visual disturbance.   Respiratory:  Negative for cough and shortness of breath.    Cardiovascular:  Negative for chest pain, palpitations and leg swelling.   Gastrointestinal:  Negative for abdominal pain, constipation, diarrhea, nausea and vomiting.   Genitourinary:  Negative for dysuria and frequency.   Musculoskeletal:  Negative for arthralgias.   Skin:  Negative for rash.   Neurological:  Negative for dizziness and light-headedness.       Objective   Medication Documentation Review Audit       Reviewed by Vannesa Miller MD (Physician) on 07/16/24 at 1018      Medication Order Taking? Sig Documenting Provider Last Dose  Status   betamethasone dipropionate (Diprolene) 0.05 % ointment [id number] No every 12 hours. Historical Provider, MD Not Taking Active   betamethasone, augmented, (Diprolene) 0.05 % lotion [id number] No Apply 1 Application topically once daily. Historical Provider, MD Taking Active   cholecalciferol (Vitamin D-3) 5,000 Units tablet [id number] No Take by mouth. Historical Provider, MD Taking Active   desoximetasone 0.05 % ointment [id number] No Apply 1 Application topically in the morning and 1 Application before bedtime. Historical Provider, MD Taking Active   doxylamine-PE-DM-acetaminophen 6.25-5- mg/15 mL liquid [id number] No 1 tab Historical Provider, MD Not Taking Active   famotidine (Pepcid) 20 mg tablet [id number]  Take 1 tablet (20 mg) by mouth once daily. Vannesa Miller MD  Active   lisinopril 20 mg tablet [id number]  Take 1 tablet (20 mg) by mouth once daily. Vannesa Miller MD  Active   propranolol (Inderal) 20 mg tablet [id number]  Take 1 tablet (20 mg) by mouth 2 times a day. Vannesa Miller MD  Active                  Allergies   Allergen Reactions    Hydrocodone-Acetaminophen Unknown    Hydroxychloroquine Other    Sulfa (Sulfonamide Antibiotics) Unknown    Amoxicillin Rash     Physical Exam  Constitutional:       Appearance: Normal appearance.   HENT:      Head: Normocephalic and atraumatic.      Nose: Nose normal.   Eyes:      Extraocular Movements: Extraocular movements intact.      Pupils: Pupils are equal, round, and reactive to light.   Cardiovascular:      Rate and Rhythm: Normal rate and regular rhythm.      Heart sounds: Murmur heard.   Pulmonary:      Breath sounds: Normal breath sounds.   Abdominal:      General: Abdomen is flat. Bowel sounds are normal.      Palpations: Abdomen is soft.   Musculoskeletal:      Right lower leg: No edema.      Left lower leg: No edema.      Comments: Patient's left forearm has a circumferential bruise with a large area swollen on the ulnar side approximately 2  "inches from the wrist.  This area is from a fall so I recommended an x-ray which patient agrees to and will get downstairs in radiology today   Neurological:      Mental Status: She is alert.     /72 (BP Location: Left arm, Patient Position: Sitting)   Pulse 80   Ht 1.702 m (5' 7\")   Wt 104 kg (229 lb 3.2 oz)   SpO2 95%   BMI 35.90 kg/m²       Assessment/Plan   Problem List Items Addressed This Visit       Cardiac murmur    Crohn's disease (Multi)    Esophageal varices (Multi)    Hepatic cirrhosis (Multi)    Hypertension     Hypertension is stable and well-controlled         Portal hypertension (Multi)    Gastroesophageal reflux disease without esophagitis     Patient's GERD is under good control.  Because of her cirrhosis from Crohn's disease she has esophageal varices and portal hypertension but she has had no GI bleeds and remained stable.           Contusion of left forearm - Primary     Patient has a large formed contusion around the entire left wrist and forearm.  She has a large bulging area on the ulnar side and I am concerned she may have a small avulsion fracture.  At this time I will get an x-ray to make sure we are not missing something.  Patient says although there is some tightness she denies any decreased range of motion and overall it is not extremely hurt         Relevant Orders    XR forearm left 2 views     Other Visit Diagnoses       Fall, initial encounter        Relevant Orders    XR forearm left 2 views                   It has been a pleasure seeing you.  Vannesa Miller MD    "

## 2024-07-16 NOTE — ASSESSMENT & PLAN NOTE
Patient has a large formed contusion around the entire left wrist and forearm.  She has a large bulging area on the ulnar side and I am concerned she may have a small avulsion fracture.  At this time I will get an x-ray to make sure we are not missing something.  Patient says although there is some tightness she denies any decreased range of motion and overall it is not extremely hurt

## 2024-07-16 NOTE — ASSESSMENT & PLAN NOTE
Patient's GERD is under good control.  Because of her cirrhosis from Crohn's disease she has esophageal varices and portal hypertension but she has had no GI bleeds and remained stable.

## 2024-10-21 DIAGNOSIS — I85.10 SECONDARY ESOPHAGEAL VARICES WITHOUT BLEEDING (MULTI): ICD-10-CM

## 2024-10-21 DIAGNOSIS — I10 PRIMARY HYPERTENSION: ICD-10-CM

## 2024-10-23 RX ORDER — PROPRANOLOL HYDROCHLORIDE 20 MG/1
20 TABLET ORAL 2 TIMES DAILY
Qty: 60 TABLET | Refills: 0 | Status: SHIPPED | OUTPATIENT
Start: 2024-10-23

## 2024-11-19 ENCOUNTER — APPOINTMENT (OUTPATIENT)
Dept: PRIMARY CARE | Facility: CLINIC | Age: 64
End: 2024-11-19
Payer: COMMERCIAL

## 2024-11-19 VITALS
WEIGHT: 229 LBS | HEART RATE: 74 BPM | BODY MASS INDEX: 35.94 KG/M2 | OXYGEN SATURATION: 95 % | HEIGHT: 67 IN | SYSTOLIC BLOOD PRESSURE: 127 MMHG | DIASTOLIC BLOOD PRESSURE: 73 MMHG

## 2024-11-19 DIAGNOSIS — K50.819 CROHN'S DISEASE OF SMALL AND LARGE INTESTINES WITH COMPLICATION (MULTI): ICD-10-CM

## 2024-11-19 DIAGNOSIS — K74.4 SECONDARY BILIARY CIRRHOSIS (MULTI): ICD-10-CM

## 2024-11-19 DIAGNOSIS — K76.6 PORTAL HYPERTENSION (MULTI): ICD-10-CM

## 2024-11-19 DIAGNOSIS — K21.9 GASTROESOPHAGEAL REFLUX DISEASE WITHOUT ESOPHAGITIS: ICD-10-CM

## 2024-11-19 DIAGNOSIS — K76.0 NONALCOHOLIC FATTY LIVER DISEASE: ICD-10-CM

## 2024-11-19 DIAGNOSIS — Z23 FLU VACCINE NEED: ICD-10-CM

## 2024-11-19 DIAGNOSIS — R01.1 CARDIAC MURMUR: ICD-10-CM

## 2024-11-19 DIAGNOSIS — M19.90 INFLAMMATORY ARTHRITIS: ICD-10-CM

## 2024-11-19 DIAGNOSIS — M32.8 OTHER FORMS OF SYSTEMIC LUPUS ERYTHEMATOSUS, UNSPECIFIED ORGAN INVOLVEMENT STATUS (MULTI): ICD-10-CM

## 2024-11-19 DIAGNOSIS — I10 PRIMARY HYPERTENSION: ICD-10-CM

## 2024-11-19 DIAGNOSIS — I35.8 AORTIC VALVE SCLEROSIS: ICD-10-CM

## 2024-11-19 DIAGNOSIS — I85.10 SECONDARY ESOPHAGEAL VARICES WITHOUT BLEEDING (MULTI): ICD-10-CM

## 2024-11-19 DIAGNOSIS — Z00.00 ANNUAL PHYSICAL EXAM: Primary | ICD-10-CM

## 2024-11-19 PROBLEM — H10.9 CONJUNCTIVITIS: Status: RESOLVED | Noted: 2023-03-23 | Resolved: 2024-11-19

## 2024-11-19 PROCEDURE — 3078F DIAST BP <80 MM HG: CPT | Performed by: INTERNAL MEDICINE

## 2024-11-19 PROCEDURE — 99214 OFFICE O/P EST MOD 30 MIN: CPT | Performed by: INTERNAL MEDICINE

## 2024-11-19 PROCEDURE — 3008F BODY MASS INDEX DOCD: CPT | Performed by: INTERNAL MEDICINE

## 2024-11-19 PROCEDURE — 1036F TOBACCO NON-USER: CPT | Performed by: INTERNAL MEDICINE

## 2024-11-19 PROCEDURE — 90471 IMMUNIZATION ADMIN: CPT | Performed by: INTERNAL MEDICINE

## 2024-11-19 PROCEDURE — 90656 IIV3 VACC NO PRSV 0.5 ML IM: CPT | Performed by: INTERNAL MEDICINE

## 2024-11-19 PROCEDURE — 3074F SYST BP LT 130 MM HG: CPT | Performed by: INTERNAL MEDICINE

## 2024-11-19 RX ORDER — PROPRANOLOL HYDROCHLORIDE 20 MG/1
20 TABLET ORAL 2 TIMES DAILY
Qty: 60 TABLET | Refills: 11 | Status: SHIPPED | OUTPATIENT
Start: 2024-11-19

## 2024-11-19 ASSESSMENT — ENCOUNTER SYMPTOMS
FREQUENCY: 0
CONSTIPATION: 0
SHORTNESS OF BREATH: 0
TROUBLE SWALLOWING: 0
DIARRHEA: 0
PALPITATIONS: 0
VOMITING: 0
FEVER: 0
ARTHRALGIAS: 0
DYSURIA: 0
NAUSEA: 0
COUGH: 0
SORE THROAT: 0
FATIGUE: 0
LIGHT-HEADEDNESS: 0
DIZZINESS: 0
ABDOMINAL PAIN: 0

## 2024-11-19 ASSESSMENT — PAIN SCALES - GENERAL: PAINLEVEL_OUTOF10: 2

## 2024-11-19 ASSESSMENT — PATIENT HEALTH QUESTIONNAIRE - PHQ9
SUM OF ALL RESPONSES TO PHQ9 QUESTIONS 1 AND 2: 0
2. FEELING DOWN, DEPRESSED OR HOPELESS: NOT AT ALL
1. LITTLE INTEREST OR PLEASURE IN DOING THINGS: NOT AT ALL

## 2024-11-19 NOTE — ASSESSMENT & PLAN NOTE
Hypertension is stable well-controlled.  She is on lisinopril 20 mg daily and propranolol 20 mg twice daily.  She does not need any refills today on the lisinopril but was given refills on propranolol

## 2024-11-19 NOTE — PROGRESS NOTES
Subjective   Patient ID: Izabela Ware is a 64 y.o. female who presents for 4 month follow up for HTN management.    Patient is here for routine 4-month follow-up for hypertension, aortic stenosis, Crohn's disease and hepatic cirrhosis.  Patient feels well overall and has no new complaints.  She has random days of diarrhea but no full-blown Crohn's exacerbations recently.  Patient received a flu shot today        Review of Systems   Constitutional:  Negative for fatigue and fever.   HENT:  Negative for sore throat and trouble swallowing.    Eyes:  Negative for visual disturbance.   Respiratory:  Negative for cough and shortness of breath.    Cardiovascular:  Negative for chest pain, palpitations and leg swelling.   Gastrointestinal:  Negative for abdominal pain, constipation, diarrhea, nausea and vomiting.   Genitourinary:  Negative for dysuria and frequency.   Musculoskeletal:  Negative for arthralgias.   Skin:  Negative for rash.   Neurological:  Negative for dizziness and light-headedness.       Objective   Medication Documentation Review Audit       Reviewed by Vannesa Miller MD (Physician) on 11/19/24 at 0949      Medication Order Taking? Sig Documenting Provider Last Dose Status   betamethasone dipropionate (Diprolene) 0.05 % ointment [id number] No every 12 hours. Historical Provider, MD Not Taking Active   betamethasone, augmented, (Diprolene) 0.05 % lotion [id number] No Apply 1 Application topically once daily. Historical Provider, MD Taking Active   cholecalciferol (Vitamin D-3) 5,000 Units tablet [id number] No Take by mouth. Historical Provider, MD Taking Active   desoximetasone 0.05 % ointment [id number] No Apply 1 Application topically in the morning and 1 Application before bedtime. Historical Provider, MD Taking Active   doxylamine-PE-DM-acetaminophen 6.25-5- mg/15 mL liquid [id number] No 1 tab Historical Provider, MD Not Taking Active   famotidine (Pepcid) 20 mg tablet [id number]  Take 1 tablet (20 mg)  "by mouth once daily. Vannesa Millre MD  Active   lisinopril 20 mg tablet [id number]  Take 1 tablet (20 mg) by mouth once daily. Vannesa Miller MD  Active   propranolol (Inderal) 20 mg tablet [id number]  Take 1 tablet (20 mg) by mouth 2 times a day. Chelsea Ledbetter MD  Active                  Allergies   Allergen Reactions    Hydrocodone-Acetaminophen Unknown    Hydroxychloroquine Other    Sulfa (Sulfonamide Antibiotics) Unknown    Amoxicillin Rash       /73   Pulse 74   Ht 1.702 m (5' 7\")   Wt 104 kg (229 lb)   SpO2 95%   BMI 35.87 kg/m²     Physical Exam  Constitutional:       Appearance: Normal appearance.   HENT:      Head: Normocephalic and atraumatic.      Nose: Nose normal.   Eyes:      Extraocular Movements: Extraocular movements intact.      Pupils: Pupils are equal, round, and reactive to light.   Cardiovascular:      Rate and Rhythm: Normal rate and regular rhythm.      Heart sounds: Murmur heard.   Pulmonary:      Breath sounds: Normal breath sounds.   Abdominal:      General: Abdomen is flat. Bowel sounds are normal.      Palpations: Abdomen is soft.   Musculoskeletal:      Right lower leg: No edema.      Left lower leg: No edema.   Neurological:      Mental Status: She is alert.           Assessment/Plan   Problem List Items Addressed This Visit       Aortic valve sclerosis     Patient's cardiac murmur is stable she denies feeling lightheaded or dizzy episodes         Relevant Medications    propranolol (Inderal) 20 mg tablet    Cardiac murmur    Crohn's disease (Multi)     No recent exacerbations of her Crohn's disease.         Esophageal varices    Relevant Medications    propranolol (Inderal) 20 mg tablet    Hepatic cirrhosis (Multi)    Hypertension     Hypertension is stable well-controlled.  She is on lisinopril 20 mg daily and propranolol 20 mg twice daily.  She does not need any refills today on the lisinopril but was given refills on propranolol         Relevant Medications    " propranolol (Inderal) 20 mg tablet    Inflammatory arthritis     Patient has had no recent exacerbations of her lupus or inflammatory arthritis.  She has been working stable but plans on retiring next year         Nonalcoholic fatty liver disease    Portal hypertension (Multi)     The propranolol is also helpful with the portal hypertension.         Other forms of systemic lupus erythematosus, unspecified organ involvement status (Multi)    Gastroesophageal reflux disease without esophagitis     Gastroesophageal reflux disease is stable on famotidine 20 mg daily.          Other Visit Diagnoses       Annual physical exam    -  Primary    Relevant Orders    Lipid Panel    CBC    Comprehensive Metabolic Panel    TSH with reflex to Free T4 if abnormal    Vitamin D 25-Hydroxy,Total (for eval of Vitamin D levels)    Flu vaccine need        Relevant Orders    Flu vaccine, trivalent, preservative free, age 6 months and greater (Fluarix/Fluzone/Flulaval) (Completed)                   It has been a pleasure seeing you.  Vannesa Miller MD

## 2024-11-19 NOTE — ASSESSMENT & PLAN NOTE
Patient has had no recent exacerbations of her lupus or inflammatory arthritis.  She has been working stable but plans on retiring next year

## 2025-03-06 DIAGNOSIS — I10 PRIMARY HYPERTENSION: ICD-10-CM

## 2025-03-06 DIAGNOSIS — I85.10 SECONDARY ESOPHAGEAL VARICES WITHOUT BLEEDING (MULTI): ICD-10-CM

## 2025-03-06 RX ORDER — FAMOTIDINE 20 MG/1
20 TABLET, FILM COATED ORAL DAILY
Qty: 30 TABLET | Refills: 11 | Status: SHIPPED | OUTPATIENT
Start: 2025-03-06

## 2025-03-06 RX ORDER — LISINOPRIL 20 MG/1
20 TABLET ORAL DAILY
Qty: 30 TABLET | Refills: 11 | Status: SHIPPED | OUTPATIENT
Start: 2025-03-06

## 2025-03-06 NOTE — TELEPHONE ENCOUNTER
Please refill    lisinopril 20 mg tablet   20 mg, Daily     famotidine (Pepcid) 20 mg tablet   20 mg, Daily     [facility]

## 2025-03-25 ENCOUNTER — APPOINTMENT (OUTPATIENT)
Dept: PRIMARY CARE | Facility: CLINIC | Age: 65
End: 2025-03-25
Payer: COMMERCIAL

## 2025-03-25 VITALS
OXYGEN SATURATION: 97 % | HEIGHT: 67 IN | DIASTOLIC BLOOD PRESSURE: 75 MMHG | WEIGHT: 218.6 LBS | HEART RATE: 67 BPM | BODY MASS INDEX: 34.31 KG/M2 | SYSTOLIC BLOOD PRESSURE: 127 MMHG

## 2025-03-25 DIAGNOSIS — K21.9 GASTROESOPHAGEAL REFLUX DISEASE WITHOUT ESOPHAGITIS: ICD-10-CM

## 2025-03-25 DIAGNOSIS — K50.119 CROHN'S DISEASE OF LARGE INTESTINE WITH COMPLICATION (MULTI): ICD-10-CM

## 2025-03-25 DIAGNOSIS — M32.8 OTHER FORMS OF SYSTEMIC LUPUS ERYTHEMATOSUS, UNSPECIFIED ORGAN INVOLVEMENT STATUS (MULTI): ICD-10-CM

## 2025-03-25 DIAGNOSIS — K76.6 PORTAL HYPERTENSION (MULTI): ICD-10-CM

## 2025-03-25 DIAGNOSIS — I10 PRIMARY HYPERTENSION: ICD-10-CM

## 2025-03-25 DIAGNOSIS — L40.9 PSORIASIS: ICD-10-CM

## 2025-03-25 DIAGNOSIS — D69.6 THROMBOCYTOPENIA (CMS-HCC): ICD-10-CM

## 2025-03-25 DIAGNOSIS — E55.9 VITAMIN D DEFICIENCY: ICD-10-CM

## 2025-03-25 DIAGNOSIS — Z12.31 SCREENING MAMMOGRAM, ENCOUNTER FOR: ICD-10-CM

## 2025-03-25 DIAGNOSIS — L20.9 ATOPIC DERMATITIS, UNSPECIFIED TYPE: ICD-10-CM

## 2025-03-25 DIAGNOSIS — Z12.31 ENCOUNTER FOR SCREENING MAMMOGRAM FOR BREAST CANCER: Primary | ICD-10-CM

## 2025-03-25 PROBLEM — S50.12XA CONTUSION OF LEFT FOREARM: Status: RESOLVED | Noted: 2024-07-16 | Resolved: 2025-03-25

## 2025-03-25 LAB
ALBUMIN SERPL-MCNC: 3.4 G/DL (ref 3.6–5.1)
ALP SERPL-CCNC: 89 U/L (ref 37–153)
ALT SERPL-CCNC: 13 U/L (ref 6–29)
ANION GAP SERPL CALCULATED.4IONS-SCNC: 6 MMOL/L (CALC) (ref 7–17)
AST SERPL-CCNC: 32 U/L (ref 10–35)
BILIRUB SERPL-MCNC: 3.1 MG/DL (ref 0.2–1.2)
BUN SERPL-MCNC: 6 MG/DL (ref 7–25)
CALCIUM SERPL-MCNC: 9.1 MG/DL (ref 8.6–10.4)
CHLORIDE SERPL-SCNC: 105 MMOL/L (ref 98–110)
CHOLEST SERPL-MCNC: 147 MG/DL
CHOLEST/HDLC SERPL: 3.3 (CALC)
CO2 SERPL-SCNC: 26 MMOL/L (ref 20–32)
CREAT SERPL-MCNC: 0.57 MG/DL (ref 0.5–1.05)
EGFRCR SERPLBLD CKD-EPI 2021: 101 ML/MIN/1.73M2
GLUCOSE SERPL-MCNC: 111 MG/DL (ref 65–99)
HDLC SERPL-MCNC: 45 MG/DL
LDLC SERPL CALC-MCNC: 87 MG/DL (CALC)
NONHDLC SERPL-MCNC: 102 MG/DL (CALC)
POTASSIUM SERPL-SCNC: 4.3 MMOL/L (ref 3.5–5.3)
PROT SERPL-MCNC: 7.7 G/DL (ref 6.1–8.1)
SODIUM SERPL-SCNC: 137 MMOL/L (ref 135–146)
TRIGL SERPL-MCNC: 68 MG/DL

## 2025-03-25 PROCEDURE — 99214 OFFICE O/P EST MOD 30 MIN: CPT | Performed by: INTERNAL MEDICINE

## 2025-03-25 PROCEDURE — 3074F SYST BP LT 130 MM HG: CPT | Performed by: INTERNAL MEDICINE

## 2025-03-25 PROCEDURE — 3008F BODY MASS INDEX DOCD: CPT | Performed by: INTERNAL MEDICINE

## 2025-03-25 PROCEDURE — 1036F TOBACCO NON-USER: CPT | Performed by: INTERNAL MEDICINE

## 2025-03-25 PROCEDURE — 3078F DIAST BP <80 MM HG: CPT | Performed by: INTERNAL MEDICINE

## 2025-03-25 RX ORDER — BETAMETHASONE DIPROPIONATE 0.5 MG/ML
1 LOTION, AUGMENTED TOPICAL DAILY
Qty: 60 ML | Refills: 3 | Status: SHIPPED | OUTPATIENT
Start: 2025-03-25

## 2025-03-25 ASSESSMENT — ENCOUNTER SYMPTOMS
DIZZINESS: 0
SHORTNESS OF BREATH: 0
PALPITATIONS: 0
LIGHT-HEADEDNESS: 0
ABDOMINAL PAIN: 0
TROUBLE SWALLOWING: 0
DIARRHEA: 0
ARTHRALGIAS: 0
CONSTIPATION: 0
FEVER: 0
FREQUENCY: 0
NAUSEA: 0
VOMITING: 0
FATIGUE: 0
COUGH: 0
SORE THROAT: 0
DYSURIA: 0

## 2025-03-25 ASSESSMENT — PAIN SCALES - GENERAL: PAINLEVEL_OUTOF10: 0-NO PAIN

## 2025-03-25 NOTE — ASSESSMENT & PLAN NOTE
Patient has known Crohn's disease with portal hypertension and esophageal varices.  She has no bleeding and no symptoms and her Crohn's is quiescent.

## 2025-03-25 NOTE — PROGRESS NOTES
Subjective   Patient ID: Izabela Ware is a 64 y.o. female who presents for 4 month follow up for HTN management.    Patient is here for routine 4-month follow-up for hypertension, Crohn's disease cirrhosis portal hypertension and low platelets.  Patient has some atopic dermatitis and uses steroid lotion which she requests a refill on.          Review of Systems   Constitutional:  Negative for fatigue and fever.   HENT:  Negative for sore throat and trouble swallowing.    Eyes:  Negative for visual disturbance.   Respiratory:  Negative for cough and shortness of breath.    Cardiovascular:  Negative for chest pain, palpitations and leg swelling.   Gastrointestinal:  Negative for abdominal pain, constipation, diarrhea, nausea and vomiting.   Genitourinary:  Negative for dysuria and frequency.   Musculoskeletal:  Negative for arthralgias.        Patient has been having some left knee pain on and off for the last month but it is better today.  She thinks it may have been related to the weather   Skin:  Negative for rash.   Neurological:  Negative for dizziness and light-headedness.       Objective   Medication Documentation Review Audit       Reviewed by Vannesa Miller MD (Physician) on 03/25/25 at 1038      Medication Order Taking? Sig Documenting Provider Last Dose Status   betamethasone dipropionate (Diprolene) 0.05 % ointment [id number] Yes every 12 hours. Historical Provider, MD Not Taking Active   betamethasone, augmented, (Diprolene) 0.05 % lotion [id number] Yes Apply 1 Application topically once daily. Historical Provider, MD Taking Active   cholecalciferol (Vitamin D-3) 5,000 Units tablet [id number] Yes Take by mouth. Historical Provider, MD Taking Active   desoximetasone 0.05 % ointment [id number] Yes Apply 1 Application topically in the morning and 1 Application before bedtime. Historical Provider, MD Taking Active   doxylamine-PE-DM-acetaminophen 6.25-5- mg/15 mL liquid [id number] No 1 tab   Patient not  "taking: Reported on 3/25/2025    Historical Provider, MD Not Taking Active   famotidine (Pepcid) 20 mg tablet [id number] Yes Take 1 tablet (20 mg) by mouth once daily. Vannesa Miller MD  Active   lisinopril 20 mg tablet [id number] Yes Take 1 tablet (20 mg) by mouth once daily. Vannesa Miller MD  Active   propranolol (Inderal) 20 mg tablet [id number] Yes Take 1 tablet (20 mg) by mouth 2 times a day. Vannesa Miller MD  Active                  Allergies   Allergen Reactions    Hydrocodone-Acetaminophen Unknown    Hydroxychloroquine Other    Sulfa (Sulfonamide Antibiotics) Unknown    Amoxicillin Rash       /75   Pulse 67   Ht 1.702 m (5' 7\")   Wt 99.2 kg (218 lb 9.6 oz)   SpO2 97%   BMI 34.24 kg/m²     Physical Exam  Constitutional:       Appearance: Normal appearance.   HENT:      Head: Normocephalic and atraumatic.      Nose: Nose normal.   Eyes:      Extraocular Movements: Extraocular movements intact.      Pupils: Pupils are equal, round, and reactive to light.   Cardiovascular:      Rate and Rhythm: Normal rate and regular rhythm.      Heart sounds: Murmur heard.   Pulmonary:      Breath sounds: Normal breath sounds.   Abdominal:      General: Abdomen is flat. Bowel sounds are normal.      Palpations: Abdomen is soft.   Musculoskeletal:      Right lower leg: No edema.      Left lower leg: No edema.   Neurological:      Mental Status: She is alert.           Assessment/Plan   Problem List Items Addressed This Visit       Crohn's disease (Multi)    Hypertension     Blood pressure stable and well-controlled.  She did not need refills today         Portal hypertension (Multi)    Thrombocytopenia (CMS-HCC)    Vitamin D deficiency     Blood drawn today and vitamin D level is pending         Relevant Orders    Vitamin D 25-Hydroxy,Total (for eval of Vitamin D levels)    Psoriasis    Screening mammogram, encounter for     Patient is due for mammogram was given an order today         Other forms of systemic lupus " erythematosus, unspecified organ involvement status (Multi)    Gastroesophageal reflux disease without esophagitis     Patient has known Crohn's disease with portal hypertension and esophageal varices.  She has no bleeding and no symptoms and her Crohn's is quiescent.          Other Visit Diagnoses       Encounter for screening mammogram for breast cancer    -  Primary    Relevant Orders    BI mammo bilateral screening tomosynthesis    Atopic dermatitis, unspecified type        Relevant Medications    betamethasone, augmented, (Diprolene) 0.05 % lotion                   It has been a pleasure seeing you.  Vannesa Miller MD

## 2025-03-26 ENCOUNTER — TELEPHONE (OUTPATIENT)
Dept: PRIMARY CARE | Facility: CLINIC | Age: 65
End: 2025-03-26
Payer: COMMERCIAL

## 2025-03-26 LAB
25(OH)D3+25(OH)D2 SERPL-MCNC: 46 NG/ML (ref 30–100)
ERYTHROCYTE [DISTWIDTH] IN BLOOD BY AUTOMATED COUNT: 12.7 % (ref 11–15)
HCT VFR BLD AUTO: 40.3 % (ref 35–45)
HGB BLD-MCNC: 13.6 G/DL (ref 11.7–15.5)
MCH RBC QN AUTO: 32.5 PG (ref 27–33)
MCHC RBC AUTO-ENTMCNC: 33.7 G/DL (ref 32–36)
MCV RBC AUTO: 96.2 FL (ref 80–100)
PLATELET # BLD AUTO: 106 THOUSAND/UL (ref 140–400)
PMV BLD REES-ECKER: 11.9 FL (ref 7.5–12.5)
RBC # BLD AUTO: 4.19 MILLION/UL (ref 3.8–5.1)
TSH SERPL-ACNC: 3.95 MIU/L (ref 0.4–4.5)
WBC # BLD AUTO: 4.7 THOUSAND/UL (ref 3.8–10.8)

## 2025-03-26 NOTE — TELEPHONE ENCOUNTER
----- Message from Vannesa Miller sent at 3/26/2025  7:50 AM EDT -----  Please notify patient her vitamin D is now in the normal range at 46.  Please continue taking her current dose of vitamin D to stay in the normal range.

## 2025-03-26 NOTE — TELEPHONE ENCOUNTER
----- Message from Vannesa Miller sent at 3/26/2025  7:52 AM EDT -----  Please notify patient her CBC is okay except for a slightly low platelet level.  Her chemistry profile shows some slight abnormalities which are chronic for her.  Also her cholesterol is mildly elevated so please work on a low-fat diet.  Vitamin D is in a separate message.

## 2025-07-22 ENCOUNTER — APPOINTMENT (OUTPATIENT)
Dept: PRIMARY CARE | Facility: CLINIC | Age: 65
End: 2025-07-22
Payer: COMMERCIAL

## 2025-08-21 ENCOUNTER — APPOINTMENT (OUTPATIENT)
Dept: PRIMARY CARE | Facility: CLINIC | Age: 65
End: 2025-08-21
Payer: COMMERCIAL

## 2025-10-07 ENCOUNTER — APPOINTMENT (OUTPATIENT)
Dept: PRIMARY CARE | Facility: CLINIC | Age: 65
End: 2025-10-07
Payer: MEDICARE